# Patient Record
Sex: FEMALE | Race: AMERICAN INDIAN OR ALASKA NATIVE | NOT HISPANIC OR LATINO | RURAL
[De-identification: names, ages, dates, MRNs, and addresses within clinical notes are randomized per-mention and may not be internally consistent; named-entity substitution may affect disease eponyms.]

---

## 2024-04-04 ENCOUNTER — OV NP (OUTPATIENT)
Dept: RURAL CLINIC 4 | Facility: CLINIC | Age: 19
End: 2024-04-04
Payer: COMMERCIAL

## 2024-04-04 ENCOUNTER — LAB (OUTPATIENT)
Dept: RURAL CLINIC 4 | Facility: CLINIC | Age: 19
End: 2024-04-04

## 2024-04-04 VITALS
HEART RATE: 76 BPM | BODY MASS INDEX: 25.76 KG/M2 | WEIGHT: 140 LBS | HEIGHT: 62 IN | TEMPERATURE: 97.5 F | SYSTOLIC BLOOD PRESSURE: 126 MMHG | DIASTOLIC BLOOD PRESSURE: 76 MMHG

## 2024-04-04 DIAGNOSIS — K59.09 CHANGE IN BOWEL MOVEMENTS INTERMITTENT CONSTIPATION. URGENCY IN THE MORNING.: ICD-10-CM

## 2024-04-04 DIAGNOSIS — R19.8 RECTAL TENESMUS: ICD-10-CM

## 2024-04-04 DIAGNOSIS — R14.0 ABDOMINAL BLOATING: ICD-10-CM

## 2024-04-04 PROBLEM — 88111009: Status: ACTIVE | Noted: 2024-04-04

## 2024-04-04 PROBLEM — 14760008: Status: ACTIVE | Noted: 2024-04-04

## 2024-04-04 PROBLEM — 160386006: Status: ACTIVE | Noted: 2024-04-04

## 2024-04-04 PROBLEM — 302770003: Status: ACTIVE | Noted: 2024-04-04

## 2024-04-04 PROBLEM — 116289008: Status: ACTIVE | Noted: 2024-04-04

## 2024-04-04 PROCEDURE — 99203 OFFICE O/P NEW LOW 30 MIN: CPT | Performed by: INTERNAL MEDICINE

## 2024-04-04 RX ORDER — MELOXICAM 7.5 MG
1 TABLET TABLET ORAL ONCE A DAY
Status: ACTIVE | COMMUNITY

## 2024-04-04 RX ORDER — SODIUM, POTASSIUM,MAG SULFATES 17.5-3.13G
354 ML SOLUTION, RECONSTITUTED, ORAL ORAL 2
Qty: 1 | Refills: 0 | OUTPATIENT
Start: 2024-04-04 | End: 2024-04-05

## 2024-04-04 RX ORDER — BUTALBITAL, ACETAMINOPHEN, AND CAFFEINE 50; 300; 40 MG/1; MG/1; MG/1
1 CAPSULE AS NEEDED CAPSULE ORAL
Status: ACTIVE | COMMUNITY

## 2024-04-04 NOTE — HPI-TODAY'S VISIT:
The patient is a 18-year-old female self referred for change in bowels with constipation, abdominal distension  and abdominal pain for the past several months. Her symptoms worsen when eating pasta/wheat products reporting intense sharp abdominal pain. She also reports episodes of rectal tenesmus and denies rectal bleeding.  She has concerns of possible IBD as her mother was diagnosed with Crohn's disease in her teens. She avoids food triggers with symptom improvement.